# Patient Record
Sex: FEMALE | Race: WHITE | NOT HISPANIC OR LATINO | Employment: FULL TIME | URBAN - METROPOLITAN AREA
[De-identification: names, ages, dates, MRNs, and addresses within clinical notes are randomized per-mention and may not be internally consistent; named-entity substitution may affect disease eponyms.]

---

## 2019-09-24 ENCOUNTER — TELEPHONE (OUTPATIENT)
Dept: ENDOCRINOLOGY | Facility: CLINIC | Age: 59
End: 2019-09-24

## 2020-01-21 ENCOUNTER — LAB VISIT (OUTPATIENT)
Dept: LAB | Facility: HOSPITAL | Age: 60
End: 2020-01-21
Attending: INTERNAL MEDICINE
Payer: COMMERCIAL

## 2020-01-21 ENCOUNTER — OFFICE VISIT (OUTPATIENT)
Dept: ENDOCRINOLOGY | Facility: CLINIC | Age: 60
End: 2020-01-21
Payer: COMMERCIAL

## 2020-01-21 ENCOUNTER — TELEPHONE (OUTPATIENT)
Dept: ENDOCRINOLOGY | Facility: CLINIC | Age: 60
End: 2020-01-21

## 2020-01-21 VITALS
WEIGHT: 136.13 LBS | HEART RATE: 84 BPM | HEIGHT: 63 IN | BODY MASS INDEX: 24.12 KG/M2 | DIASTOLIC BLOOD PRESSURE: 80 MMHG | RESPIRATION RATE: 18 BRPM | SYSTOLIC BLOOD PRESSURE: 126 MMHG

## 2020-01-21 DIAGNOSIS — E55.9 VITAMIN D INSUFFICIENCY: ICD-10-CM

## 2020-01-21 DIAGNOSIS — E04.2 MULTIPLE THYROID NODULES: ICD-10-CM

## 2020-01-21 DIAGNOSIS — M81.0 AGE-RELATED OSTEOPOROSIS WITHOUT CURRENT PATHOLOGICAL FRACTURE: ICD-10-CM

## 2020-01-21 LAB
25(OH)D3+25(OH)D2 SERPL-MCNC: 12 NG/ML (ref 30–96)
ALBUMIN SERPL BCP-MCNC: 4.2 G/DL (ref 3.5–5.2)
ALP SERPL-CCNC: 160 U/L (ref 55–135)
ALT SERPL W/O P-5'-P-CCNC: 13 U/L (ref 10–44)
ANION GAP SERPL CALC-SCNC: 6 MMOL/L (ref 8–16)
AST SERPL-CCNC: 37 U/L (ref 10–40)
BILIRUB SERPL-MCNC: 0.2 MG/DL (ref 0.1–1)
BUN SERPL-MCNC: 13 MG/DL (ref 6–20)
CALCIUM SERPL-MCNC: 9.3 MG/DL (ref 8.7–10.5)
CHLORIDE SERPL-SCNC: 102 MMOL/L (ref 95–110)
CO2 SERPL-SCNC: 30 MMOL/L (ref 23–29)
CREAT SERPL-MCNC: 0.8 MG/DL (ref 0.5–1.4)
EST. GFR  (AFRICAN AMERICAN): >60 ML/MIN/1.73 M^2
EST. GFR  (NON AFRICAN AMERICAN): >60 ML/MIN/1.73 M^2
GLUCOSE SERPL-MCNC: 93 MG/DL (ref 70–110)
POTASSIUM SERPL-SCNC: 3.9 MMOL/L (ref 3.5–5.1)
PROT SERPL-MCNC: 7.5 G/DL (ref 6–8.4)
SODIUM SERPL-SCNC: 138 MMOL/L (ref 136–145)
TSH SERPL DL<=0.005 MIU/L-ACNC: 1.31 UIU/ML (ref 0.4–4)

## 2020-01-21 PROCEDURE — 3008F BODY MASS INDEX DOCD: CPT | Mod: CPTII,S$GLB,, | Performed by: INTERNAL MEDICINE

## 2020-01-21 PROCEDURE — 99999 PR PBB SHADOW E&M-EST. PATIENT-LVL III: ICD-10-PCS | Mod: PBBFAC,,, | Performed by: INTERNAL MEDICINE

## 2020-01-21 PROCEDURE — 3008F PR BODY MASS INDEX (BMI) DOCUMENTED: ICD-10-PCS | Mod: CPTII,S$GLB,, | Performed by: INTERNAL MEDICINE

## 2020-01-21 PROCEDURE — 80053 COMPREHEN METABOLIC PANEL: CPT

## 2020-01-21 PROCEDURE — 99999 PR PBB SHADOW E&M-EST. PATIENT-LVL III: CPT | Mod: PBBFAC,,, | Performed by: INTERNAL MEDICINE

## 2020-01-21 PROCEDURE — 99204 OFFICE O/P NEW MOD 45 MIN: CPT | Mod: S$GLB,,, | Performed by: INTERNAL MEDICINE

## 2020-01-21 PROCEDURE — 82306 VITAMIN D 25 HYDROXY: CPT

## 2020-01-21 PROCEDURE — 99204 PR OFFICE/OUTPT VISIT, NEW, LEVL IV, 45-59 MIN: ICD-10-PCS | Mod: S$GLB,,, | Performed by: INTERNAL MEDICINE

## 2020-01-21 PROCEDURE — 36415 COLL VENOUS BLD VENIPUNCTURE: CPT

## 2020-01-21 PROCEDURE — 84443 ASSAY THYROID STIM HORMONE: CPT

## 2020-01-21 NOTE — ASSESSMENT & PLAN NOTE
Foot fractures only   Otherwise no falls or fractures    Calcium and vitamin D -- due to personal experience of change in dilantin level and increase risk for seizures with vitamin D supplementation, would like to speak with neurologist before starting medication    Exercise for balance     DXA at her convenience

## 2020-01-21 NOTE — TELEPHONE ENCOUNTER
Called patient and left detailed message.     Vitamin D is low  Other labs are normal. In the past stopped vitamin D as she was concerned it changed her dilantin levels.     Have asked her to please check in with her  Neurologist regarding vitamin D replacement.       Vitamin D deficiency -- needs to start treatment but wants to check in with neurologist.     Results for VALENTINO NIEVES (MRN [id number]) as of 1/21/2020 10:18   Ref. Range 1/21/2020 08:57   Sodium Latest Ref Range: 136 - 145 mmol/L 138   Potassium Latest Ref Range: 3.5 - 5.1 mmol/L 3.9   Chloride Latest Ref Range: 95 - 110 mmol/L 102   CO2 Latest Ref Range: 23 - 29 mmol/L 30 (H)   Anion Gap Latest Ref Range: 8 - 16 mmol/L 6 (L)   BUN, Bld Latest Ref Range: 6 - 20 mg/dL 13   Creatinine Latest Ref Range: 0.5 - 1.4 mg/dL 0.8   eGFR if non African American Latest Ref Range: >60 mL/min/1.73 m^2 >60.0   eGFR if African American Latest Ref Range: >60 mL/min/1.73 m^2 >60.0   Glucose Latest Ref Range: 70 - 110 mg/dL 93   Calcium Latest Ref Range: 8.7 - 10.5 mg/dL 9.3   Alkaline Phosphatase Latest Ref Range: 55 - 135 U/L 160 (H)   PROTEIN TOTAL Latest Ref Range: 6.0 - 8.4 g/dL 7.5   Albumin Latest Ref Range: 3.5 - 5.2 g/dL 4.2   BILIRUBIN TOTAL Latest Ref Range: 0.1 - 1.0 mg/dL 0.2   AST Latest Ref Range: 10 - 40 U/L 37   ALT Latest Ref Range: 10 - 44 U/L 13   Vit D, 25-Hydroxy Latest Ref Range: 30 - 96 ng/mL 12 (L)   TSH Latest Ref Range: 0.400 - 4.000 uIU/mL 1.315

## 2020-01-21 NOTE — PROGRESS NOTES
Subjective:      Patient ID: Nicole Perdomo is a 59 y.o. female.    Chief Complaint:  Osteopenia and Thyroid Nodule      History of Present Illness  Ms. Perdomo is a 59 year old woman who is here for evaluation of multiple thyroid nodules. This was discovered six years ago by carotid ultrasound screening.      Subsequently thyroid nodule was confirmed on thyroid US, 2012, 2013 and 2015. Had FNA in 2013 of right solid nodule with benign findings and left nodule with cystic component demonstrates insufficient cells. Based on US appearance, we decided to repeat US in one year. She is here for follow up.     Denies pre-existing thyroid disease, denies symptoms of hyper- or hypo- thyroidism. Denies anterior neck pressure, dysphagia, or voice changes. Denies exposure to radiation or treatment with radiation to the head or neck.      Denies family history of thyroid cancer or thyroid disease.    Denies personal history of breast or colon cancer.      Osteopenia treated for seven years, with evista and alendronate thinks she took evista for two to three years, when became expensive stopped.    Fractured foot. Fell in her attic three years ago. Denies height loss or new back pain.   Balance is pretty good    Does not exercise    Supplements:  Calcium:   Whenever she remembers she takes it   Vitamin D: none   MVI: none  Due to change in dilantin levels does not like to take supplements    Dietary:  Milk, yogurt, cheese    Review of Systems   Constitutional: Negative for chills and fever.   HENT: Negative for congestion and sinus pressure.    Eyes: Negative for visual disturbance.   Respiratory: Negative for chest tightness and shortness of breath.    Cardiovascular: Negative for chest pain, palpitations and leg swelling.   Gastrointestinal: Negative for abdominal pain and vomiting.   Genitourinary: Negative for dysuria.   Musculoskeletal: Negative for arthralgias.   Skin: Negative for rash.   Neurological: Negative for  "weakness.   Hematological: Does not bruise/bleed easily.   Psychiatric/Behavioral: Positive for sleep disturbance (  occasionally).       Objective:   Physical Exam   Constitutional: She is oriented to person, place, and time. She appears well-developed and well-nourished. No distress.   Eyes: Pupils are equal, round, and reactive to light. Conjunctivae and EOM are normal. No scleral icterus.   No proptosis.    Neck: Normal range of motion. Neck supple. No tracheal deviation present. No thyromegaly present.   Cardiovascular: Normal rate and intact distal pulses.   Pulmonary/Chest: Effort normal and breath sounds normal.   Lymphadenopathy:     She has no cervical adenopathy.   Neurological: She is alert and oriented to person, place, and time. She has normal reflexes.   No tremor.   Skin: Skin is warm and dry. No rash noted.   Psychiatric: She has a normal mood and affect.     Vitals:    01/21/20 0812   BP: 126/80   BP Location: Left arm   Patient Position: Sitting   BP Method: Small (Manual)   Pulse: 84   Resp: 18   Weight: 61.7 kg (136 lb 2.1 oz)   Height: 5' 3" (1.6 m)       BP Readings from Last 3 Encounters:   01/21/20 126/80   06/17/15 128/77   06/03/13 130/90     Wt Readings from Last 1 Encounters:   01/21/20 0812 61.7 kg (136 lb 2.1 oz)         Body mass index is 24.12 kg/m².    Lab Review:   No results found for: HGBA1C  No results found for: CHOL, HDL, LDLCALC, TRIG, CHOLHDL  Lab Results   Component Value Date     01/21/2020    K 3.9 01/21/2020     01/21/2020    CO2 30 (H) 01/21/2020    GLU 93 01/21/2020    BUN 13 01/21/2020    CREATININE 0.8 01/21/2020    CALCIUM 9.3 01/21/2020    PROT 7.5 01/21/2020    ALBUMIN 4.2 01/21/2020    BILITOT 0.2 01/21/2020    ALKPHOS 160 (H) 01/21/2020    AST 37 01/21/2020    ALT 13 01/21/2020    ANIONGAP 6 (L) 01/21/2020    ESTGFRAFRICA >60.0 01/21/2020    EGFRNONAA >60.0 01/21/2020    TSH 1.315 01/21/2020      2015:  The right lobe measures 5.7 x 2.5 x 2.6 cm " and demonstrates two nodules one measuring 2.2 x 1.0 x 1.8 cm (previous of 2.0 x 1.0 x 1.8 centimeters) and an additional one measuring 3.2 x 2.4 x 2.6 cm (previously 3.3 x 2.3 x 2.6 centimeters).      The left lobe measures 4.1 x 1.4 x 1.8 cm and demonstrates a cyst within the superior pole of the lobe measuring 2.2 x 0.9 x 1.5 cm (previously 2.7 x 0.8 x 1.9 cm) and a nodule within the inferior pole measuring 0.9 x 0.6 x 0.9 cm (previously 0.9 x 0.6 x   0.9 cm).     FNA 2013:  SPECIMEN  1) FNA LEFT Thyroid (Clinician)  FINAL PATHOLOGIC DIAGNOSIS  [employer] Cytopathology Thyroid Category:Unsatisfactory. Macrophages only.  SPECIMEN  1) FNA RIGHT Thyroid (Clinician)  FINAL PATHOLOGIC DIAGNOSIS  [employer] CYTOPATHOLOGY CATEGORY: BENIGN/NEGATIVE FOR MALIGNANCY  Innocuous follicular clusters. A small amount of colloid.  Assessment and Plan     Multiple thyroid nodules  Needs updated TSH  Clinically euthyroid         Age-related osteoporosis without current pathological fracture  Foot fractures only   Otherwise no falls or fractures    Calcium and vitamin D -- due to personal experience of change in dilantin level and increase risk for seizures with vitamin D supplementation, would like to speak with neurologist before starting medication    Exercise for balance     DXA at her convenience    Vitamin D insufficiency  Previous levels low  No supplementation   Repeat levels today

## 2020-02-24 ENCOUNTER — HOSPITAL ENCOUNTER (OUTPATIENT)
Dept: RADIOLOGY | Facility: HOSPITAL | Age: 60
Discharge: HOME | End: 2020-02-24
Attending: INTERNAL MEDICINE
Payer: COMMERCIAL

## 2020-02-24 ENCOUNTER — HOSPITAL ENCOUNTER (OUTPATIENT)
Dept: RADIOLOGY | Facility: CLINIC | Age: 60
Discharge: HOME | End: 2020-02-24
Attending: INTERNAL MEDICINE
Payer: COMMERCIAL

## 2020-02-24 DIAGNOSIS — M81.0 AGE-RELATED OSTEOPOROSIS WITHOUT CURRENT PATHOLOGICAL FRACTURE: ICD-10-CM

## 2020-02-24 DIAGNOSIS — E04.2 MULTIPLE THYROID NODULES: ICD-10-CM

## 2020-02-24 PROCEDURE — 76536 US EXAM OF HEAD AND NECK: CPT | Mod: TC

## 2020-02-24 PROCEDURE — 76536 US SOFT TISSUE HEAD NECK THYROID: ICD-10-PCS | Mod: 26,,, | Performed by: INTERNAL MEDICINE

## 2020-02-24 PROCEDURE — 77080 DEXA BONE DENSITY SPINE HIP: ICD-10-PCS | Mod: 26,,, | Performed by: INTERNAL MEDICINE

## 2020-02-24 PROCEDURE — 76536 US EXAM OF HEAD AND NECK: CPT | Mod: 26,,, | Performed by: INTERNAL MEDICINE

## 2020-02-24 PROCEDURE — 77080 DXA BONE DENSITY AXIAL: CPT | Mod: 26,,, | Performed by: INTERNAL MEDICINE

## 2020-02-24 PROCEDURE — 77080 DXA BONE DENSITY AXIAL: CPT | Mod: TC

## 2020-02-26 DIAGNOSIS — Z12.31 VISIT FOR SCREENING MAMMOGRAM: Primary | ICD-10-CM

## 2020-02-28 ENCOUNTER — HOSPITAL ENCOUNTER (OUTPATIENT)
Dept: RADIOLOGY | Facility: HOSPITAL | Age: 60
Discharge: HOME | End: 2020-02-28
Attending: OBSTETRICS & GYNECOLOGY
Payer: COMMERCIAL

## 2020-02-28 DIAGNOSIS — Z12.31 VISIT FOR SCREENING MAMMOGRAM: ICD-10-CM

## 2020-02-28 PROCEDURE — 77067 MAMMO DIGITAL SCREENING BILAT WITH TOMOSYNTHESIS_CAD: ICD-10-PCS | Mod: 26,,, | Performed by: RADIOLOGY

## 2020-02-28 PROCEDURE — 77067 SCR MAMMO BI INCL CAD: CPT | Mod: 26,,, | Performed by: RADIOLOGY

## 2020-02-28 PROCEDURE — 77063 MAMMO DIGITAL SCREENING BILAT WITH TOMOSYNTHESIS_CAD: ICD-10-PCS | Mod: 26,,, | Performed by: RADIOLOGY

## 2020-02-28 PROCEDURE — 77067 SCR MAMMO BI INCL CAD: CPT | Mod: TC

## 2020-02-28 PROCEDURE — 77063 BREAST TOMOSYNTHESIS BI: CPT | Mod: 26,,, | Performed by: RADIOLOGY

## 2020-05-19 ENCOUNTER — TELEPHONE (OUTPATIENT)
Dept: ENDOCRINOLOGY | Facility: CLINIC | Age: 60
End: 2020-05-19

## 2020-05-19 NOTE — TELEPHONE ENCOUNTER
----- Message from Errol Luna sent at 5/19/2020  2:35 PM CDT -----  Contact: Patient @ [phone]  Patient requesting a return call about getting a high dose Vitamin D, [facility] call or forward to:     [facility] - [place] ([place], [place] [address]  [address] ([place] [address]  Phone: [phone] Fax: [phone]

## 2020-05-19 NOTE — TELEPHONE ENCOUNTER
Talk to pt she want to take higher dosage of  vit d I ask can she do vit d lab to check  her levels since  it's been 6 months but said that her primary dr told her do not need to do labs dr euceda should just changed her dose higher. I told pt I will send dr euceda a message to see if she can changed her medication with doing labs. Pt understand

## 2020-05-22 ENCOUNTER — TELEPHONE (OUTPATIENT)
Dept: ENDOCRINOLOGY | Facility: CLINIC | Age: 60
End: 2020-05-22

## 2020-05-22 DIAGNOSIS — M81.0 AGE-RELATED OSTEOPOROSIS WITHOUT CURRENT PATHOLOGICAL FRACTURE: ICD-10-CM

## 2020-05-22 DIAGNOSIS — E55.9 VITAMIN D INSUFFICIENCY: Primary | ICD-10-CM

## 2020-05-22 RX ORDER — ERGOCALCIFEROL 1.25 MG/1
50000 CAPSULE ORAL
Qty: 12 CAPSULE | Refills: 0 | Status: SHIPPED | OUTPATIENT
Start: 2020-05-22 | End: 2022-08-16

## 2020-05-22 NOTE — TELEPHONE ENCOUNTER
----- Message from Giselle Caruso MD sent at 5/22/2020  9:30 AM CDT -----  Labs in five to six months, f/u one week later

## 2021-03-15 ENCOUNTER — IMMUNIZATION (OUTPATIENT)
Dept: PRIMARY CARE CLINIC | Facility: CLINIC | Age: 61
End: 2021-03-15
Payer: COMMERCIAL

## 2021-03-15 DIAGNOSIS — Z23 NEED FOR VACCINATION: Primary | ICD-10-CM

## 2021-03-15 PROCEDURE — 0001A COVID-19, MRNA, LNP-S, PF, 30 MCG/0.3 ML DOSE VACCINE: ICD-10-PCS | Mod: CV19,S$GLB,, | Performed by: INTERNAL MEDICINE

## 2021-03-15 PROCEDURE — 91300 COVID-19, MRNA, LNP-S, PF, 30 MCG/0.3 ML DOSE VACCINE: CPT | Mod: S$GLB,,, | Performed by: INTERNAL MEDICINE

## 2021-03-15 PROCEDURE — 91300 COVID-19, MRNA, LNP-S, PF, 30 MCG/0.3 ML DOSE VACCINE: ICD-10-PCS | Mod: S$GLB,,, | Performed by: INTERNAL MEDICINE

## 2021-03-15 PROCEDURE — 0001A COVID-19, MRNA, LNP-S, PF, 30 MCG/0.3 ML DOSE VACCINE: CPT | Mod: CV19,S$GLB,, | Performed by: INTERNAL MEDICINE

## 2021-04-05 ENCOUNTER — IMMUNIZATION (OUTPATIENT)
Dept: PRIMARY CARE CLINIC | Facility: CLINIC | Age: 61
End: 2021-04-05
Payer: COMMERCIAL

## 2021-04-05 DIAGNOSIS — Z23 NEED FOR VACCINATION: Primary | ICD-10-CM

## 2021-04-05 PROCEDURE — 91300 COVID-19, MRNA, LNP-S, PF, 30 MCG/0.3 ML DOSE VACCINE: ICD-10-PCS | Mod: S$GLB,,, | Performed by: INTERNAL MEDICINE

## 2021-04-05 PROCEDURE — 91300 COVID-19, MRNA, LNP-S, PF, 30 MCG/0.3 ML DOSE VACCINE: CPT | Mod: S$GLB,,, | Performed by: INTERNAL MEDICINE

## 2021-04-05 PROCEDURE — 0002A COVID-19, MRNA, LNP-S, PF, 30 MCG/0.3 ML DOSE VACCINE: ICD-10-PCS | Mod: CV19,S$GLB,, | Performed by: INTERNAL MEDICINE

## 2021-04-05 PROCEDURE — 0002A COVID-19, MRNA, LNP-S, PF, 30 MCG/0.3 ML DOSE VACCINE: CPT | Mod: CV19,S$GLB,, | Performed by: INTERNAL MEDICINE

## 2022-04-25 ENCOUNTER — TELEPHONE (OUTPATIENT)
Dept: ENDOCRINOLOGY | Facility: CLINIC | Age: 62
End: 2022-04-25
Payer: COMMERCIAL

## 2022-04-25 NOTE — TELEPHONE ENCOUNTER
----- Message from Mulu Stubbs sent at 4/25/2022 10:35 AM CDT -----  Regarding: appt  Contact: [phone] or [phone]  Patient Nicole is calling. Patient would like to schedule an appt. Patient is having problems with her thyroids. Please call [phone] or [phone]      Thank You

## 2022-05-31 ENCOUNTER — NURSE TRIAGE (OUTPATIENT)
Dept: ADMINISTRATIVE | Facility: CLINIC | Age: 62
End: 2022-05-31
Payer: COMMERCIAL

## 2022-05-31 NOTE — TELEPHONE ENCOUNTER
[place]    PCP:  None    C/O scratchy throat, nasal congestion, runny nose, and cough.  Denies SOB, fever, and CP.  SpO2=97% GZ=158.  Per protocol, care advised is home care.  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  [facility] offered to pt and accepted.  NOC spoke with She with [facility] and call warm transferred to [facility] to schedule an appt for tomorrow morning which is the 1st available appt that they have.  Instructed to call for questions/concerns.  VU.  Unable to route d/t no PCP.    Reason for Disposition   [1] COVID-19 infection suspected by caller or triager AND [2] mild symptoms (cough, fever, or others) AND [3] has not gotten tested yet    Additional Information   Negative: SEVERE difficulty breathing (e.g., struggling for each breath, speaks in single words)   Negative: Difficult to awaken or acting confused (e.g., disoriented, slurred speech)   Negative: Bluish (or gray) lips or face now   Negative: Shock suspected (e.g., cold/pale/clammy skin, too weak to stand, low BP, rapid pulse)   Negative: Sounds like a life-threatening emergency to the triager   Negative: SEVERE or constant chest pain or pressure  (Exception: Mild central chest pain, present only when coughing.)   Negative: MODERATE difficulty breathing (e.g., speaks in phrases, SOB even at rest, pulse 100-120)   Negative: [1] Headache AND [2] stiff neck (can't touch chin to chest)   Negative: Oxygen level (e.g., pulse oximetry) 90 percent or lower   Negative: Chest pain or pressure   Negative: Patient sounds very sick or weak to the triager   Negative: MILD difficulty breathing (e.g., minimal/no SOB at rest, SOB with walking, pulse <100)   Negative: Fever > 103 F (39.4 C)   Negative: [1] Fever > 101 F (38.3 C) AND [2] age > 60 years   Negative: [1] Fever > 100.0 F (37.8 C) AND [2] bedridden (e.g., nursing home patient, CVA, chronic illness, recovering from  surgery)   Negative: HIGH RISK for severe COVID complications (e.g., weak immune system, age > 64 years, obesity with BMI > 25, pregnant, chronic lung disease or other chronic medical condition)  (Exception: Already seen by PCP and no new or worsening symptoms.)   Negative: [1] HIGH RISK patient AND [2] influenza is widespread in the community AND [3] ONE OR MORE respiratory symptoms: cough, sore throat, runny or stuffy nose   Negative: [1] HIGH RISK patient AND [2] influenza exposure within the last 7 days AND [3] ONE OR MORE respiratory symptoms: cough, sore throat, runny or stuffy nose   Negative: Oxygen level (e.g., pulse oximetry) 91 to 94 percent   Negative: Fever present > 3 days (72 hours)   Negative: [1] Fever returns after gone for over 24 hours AND [2] symptoms worse or not improved   Negative: [1] Continuous (nonstop) coughing interferes with work or school AND [2] no improvement using cough treatment per Care Advice   Negative: [1] COVID-19 infection suspected by caller or triager AND [2] mild symptoms (cough, fever, or others) AND [3] negative COVID-19 rapid test   Negative: Cough present > 3 weeks   Negative: [1] COVID-19 diagnosed by positive lab test (e.g., PCR, rapid self-test kit) AND [2] NO symptoms (e.g., cough, fever, others)   Negative: [1] COVID-19 diagnosed by positive lab test (e.g., PCR, rapid self-test kit) AND [2] mild symptoms (e.g., cough, fever, others) AND [3] no complications or SOB   Negative: [1] COVID-19 diagnosed by doctor (or NP/PA) AND [2] mild symptoms (e.g., cough, fever, others) AND [3] no complications or SOB   Negative: [1] COVID-19 diagnosed AND [2] has mild nausea, vomiting or diarrhea    Protocols used: CORONAVIRUS (COVID-19) DIAGNOSED OR [id number]

## 2022-06-01 ENCOUNTER — NURSE TRIAGE (OUTPATIENT)
Dept: ADMINISTRATIVE | Facility: CLINIC | Age: 62
End: 2022-06-01
Payer: COMMERCIAL

## 2022-06-01 NOTE — TELEPHONE ENCOUNTER
Patient states she spoke with [facility] last night and they were to come out today. The appointment she had for today did not work, so she canceled it. Now would like to re book appointment. She would like to speak with [facility]. Call transferred.     Reason for Disposition   Information only question and nurse able to answer    Protocols used: INFORMATION ONLY CALL - NO TRIAGE-A-[place]

## 2022-06-01 NOTE — TELEPHONE ENCOUNTER
Patient states she has an appointment with [facility] scheduled for 6/2/22 @ 4PM for a Covid-19 test. Patient called to inquire if there are any earlier appointments available with [facility] for today, 6/01/22.    Triage RN contacted [facility] and spoke with Representative She and discussed patient's request for an earlier appointment. She stated that there are no appointments available today and patient is on a call list for cancellations.    Care Advice given to patient that no appointments are available at this time with [facility] for today and that her name is on a call list for cancellations. Patient stated understanding of Care Advice and cites no additional concerns at this time.    Reason for Disposition   General information question, no triage required and triager able to answer question    Additional Information   Negative: [1] Caller is not with the adult (patient) AND [2] reporting urgent symptoms   Negative: Lab result questions   Negative: Medication questions   Negative: Caller can't be reached by phone   Negative: Caller has already spoken to PCP or another triager   Negative: RN needs further essential information from caller in order to complete triage   Negative: Requesting regular office appointment   Negative: [1] Caller requesting NON-URGENT health information AND [2] PCP's office is the best resource   Negative: Health Information question, no triage required and triager able to answer question    Protocols used: INFORMATION ONLY CALL - [facility]

## 2022-08-15 NOTE — PROGRESS NOTES
Subjective:      Patient ID: Nicole Perdomo is a 62 y.o. female.    Chief Complaint:  Thyroid Nodule      History of Present Illness    Ms. Perdomo is a 62 year old woman who is here for evaluation of multiple thyroid nodules. This was discovered six years ago by carotid ultrasound screening.      Subsequently thyroid nodule was confirmed on thyroid US, 2012, 2013 and 2015. Had FNA in 2013 of right solid nodule with benign findings and left nodule with cystic component demonstrates insufficient cells. Based on US appearance, we decided to repeat US only.     Also reports GERD symptoms at night.   Denies pre-existing thyroid disease, denies symptoms of hyper- or hypo- thyroidism. Denies anterior neck pressure, dysphagia, or voice changes. Denies exposure to radiation or treatment with radiation to the head or neck.      Denies family history of thyroid cancer or thyroid disease.    Denies personal history of breast or colon cancer.      Osteoporosis treated for seven years.  Stopped fosamax ten years ago  Stopped taking vitamin D   Evista for about three years (after fosamax)     Balance is pretty good   Does not exercise     Supplements:  Calcium:   Whenever she remembers she takes it   Vitamin D: none   MVI: none  Due to change in dilantin levels does not like to take supplements     Dietary:  Milk, yogurt, cheese    Review of Systems  No recent illness   Objective:   Physical Exam  Constitutional:       General: She is not in acute distress.     Appearance: She is well-developed.   Eyes:      General: No scleral icterus.     Conjunctiva/sclera: Conjunctivae normal.      Pupils: Pupils are equal, round, and reactive to light.      Comments: No proptosis.    Neck:      Thyroid: No thyromegaly.      Trachea: No tracheal deviation.   Cardiovascular:      Rate and Rhythm: Normal rate.   Pulmonary:      Effort: Pulmonary effort is normal.      Breath sounds: Normal breath sounds.   Musculoskeletal:      Cervical back:  "Normal range of motion and neck supple.   Lymphadenopathy:      Cervical: No cervical adenopathy.   Skin:     General: Skin is warm and dry.      Findings: No rash.   Neurological:      Mental Status: She is alert and oriented to person, place, and time.      Deep Tendon Reflexes: Reflexes are normal and symmetric.      Comments: No tremor.       Vitals:    08/16/22 1435   BP: 126/80   BP Location: Left arm   Patient Position: Sitting   BP Method: Medium (Manual)   Pulse: 98   SpO2: 96%   Weight: 65.4 kg (144 lb 1.1 oz)   Height: 5' 3" (1.6 m)       BP Readings from Last 3 Encounters:   08/16/22 126/80   01/21/20 126/80   06/17/15 128/77     Wt Readings from Last 1 Encounters:   08/16/22 1435 65.4 kg (144 lb 1.1 oz)         Body mass index is 25.52 kg/m².    Lab Review:   No results found for: HGBA1C  No results found for: CHOL, HDL, LDLCALC, TRIG, CHOLHDL  Lab Results   Component Value Date     01/21/2020    K 3.9 01/21/2020     01/21/2020    CO2 30 (H) 01/21/2020    GLU 93 01/21/2020    BUN 13 01/21/2020    CREATININE 0.8 01/21/2020    CALCIUM 9.3 01/21/2020    PROT 7.5 01/21/2020    ALBUMIN 4.2 01/21/2020    BILITOT 0.2 01/21/2020    ALKPHOS 160 (H) 01/21/2020    AST 37 01/21/2020    ALT 13 01/21/2020    ANIONGAP 6 (L) 01/21/2020    ESTGFRAFRICA >60.0 01/21/2020    EGFRNONAA >60.0 01/21/2020    TSH 1.315 01/21/2020     US 2020  The gland is enlarged on the right, measuring 5.7 x 2.5 x 2.3 cm.  The left lobe is not enlarged, 3.8 x 1.4 x 1.6 cm.     The right thyroid demonstrates a superior 2.1 x 0.9 x 1.4 cm solid hypoechoic nodule, TR 4.  This would meet criteria for fine-needle aspiration based on its size.  This is stable compared to May 2013.     The mid to inferior right thyroid demonstrates a 3.3 x 2.4 x 2.3 cm solid nodule which has undergone prior fine-needle aspiration.  It is unchanged in size.     The left thyroid demonstrates a 1.4 x 0.4 x 0.9 cm predominantly cystic nodule which has " undergone prior fine-needle aspiration and is decreased in size today.     Additional subcentimeter left thyroid nodules are present, benign.     No abnormal lymphadenopathy in the neck.       Assessment and Plan     Multiple thyroid nodules  No labs done recently     Lab: TSH and ultrasound of the thyroid     Vitamin D insufficiency  Very low vitamin D in the past   Recommend over the counter vitamin D 1000 - 2000 IUs     Age-related osteoporosis without current pathological fracture  Risk factors: , early 50s, no MHT and vitamin D deficiency     Restart 1000 IUs daily   Dietary calcium     Needs updated DXA scan   Could start alendronate again

## 2022-08-16 ENCOUNTER — OFFICE VISIT (OUTPATIENT)
Dept: ENDOCRINOLOGY | Facility: CLINIC | Age: 62
End: 2022-08-16
Payer: COMMERCIAL

## 2022-08-16 VITALS
BODY MASS INDEX: 25.52 KG/M2 | DIASTOLIC BLOOD PRESSURE: 80 MMHG | SYSTOLIC BLOOD PRESSURE: 126 MMHG | WEIGHT: 144.06 LBS | OXYGEN SATURATION: 96 % | HEIGHT: 63 IN | HEART RATE: 98 BPM

## 2022-08-16 DIAGNOSIS — M81.0 AGE-RELATED OSTEOPOROSIS WITHOUT CURRENT PATHOLOGICAL FRACTURE: ICD-10-CM

## 2022-08-16 DIAGNOSIS — E04.2 MULTIPLE THYROID NODULES: ICD-10-CM

## 2022-08-16 DIAGNOSIS — E55.9 VITAMIN D INSUFFICIENCY: ICD-10-CM

## 2022-08-16 PROCEDURE — 99999 PR PBB SHADOW E&M-EST. PATIENT-LVL III: CPT | Mod: PBBFAC,,, | Performed by: INTERNAL MEDICINE

## 2022-08-16 PROCEDURE — 3074F SYST BP LT 130 MM HG: CPT | Mod: CPTII,S$GLB,, | Performed by: INTERNAL MEDICINE

## 2022-08-16 PROCEDURE — 3074F PR MOST RECENT SYSTOLIC BLOOD PRESSURE < 130 MM HG: ICD-10-PCS | Mod: CPTII,S$GLB,, | Performed by: INTERNAL MEDICINE

## 2022-08-16 PROCEDURE — 3079F DIAST BP 80-89 MM HG: CPT | Mod: CPTII,S$GLB,, | Performed by: INTERNAL MEDICINE

## 2022-08-16 PROCEDURE — 3008F BODY MASS INDEX DOCD: CPT | Mod: CPTII,S$GLB,, | Performed by: INTERNAL MEDICINE

## 2022-08-16 PROCEDURE — 1159F PR MEDICATION LIST DOCUMENTED IN MEDICAL RECORD: ICD-10-PCS | Mod: CPTII,S$GLB,, | Performed by: INTERNAL MEDICINE

## 2022-08-16 PROCEDURE — 1159F MED LIST DOCD IN RCRD: CPT | Mod: CPTII,S$GLB,, | Performed by: INTERNAL MEDICINE

## 2022-08-16 PROCEDURE — 99214 OFFICE O/P EST MOD 30 MIN: CPT | Mod: S$GLB,,, | Performed by: INTERNAL MEDICINE

## 2022-08-16 PROCEDURE — 3008F PR BODY MASS INDEX (BMI) DOCUMENTED: ICD-10-PCS | Mod: CPTII,S$GLB,, | Performed by: INTERNAL MEDICINE

## 2022-08-16 PROCEDURE — 99999 PR PBB SHADOW E&M-EST. PATIENT-LVL III: ICD-10-PCS | Mod: PBBFAC,,, | Performed by: INTERNAL MEDICINE

## 2022-08-16 PROCEDURE — 3079F PR MOST RECENT DIASTOLIC BLOOD PRESSURE 80-89 MM HG: ICD-10-PCS | Mod: CPTII,S$GLB,, | Performed by: INTERNAL MEDICINE

## 2022-08-16 PROCEDURE — 99214 PR OFFICE/OUTPT VISIT, EST, LEVL IV, 30-39 MIN: ICD-10-PCS | Mod: S$GLB,,, | Performed by: INTERNAL MEDICINE

## 2022-08-16 NOTE — PATIENT INSTRUCTIONS
Check your vitamin D levels, kidney and liver function tests and thyroid tests     Recommend vitamin D 2000 IUs daily     Low vitamin D is not good for bones.     Schedule bone density test     Schedule an ultrasound of thyroid gland.

## 2022-08-16 NOTE — ASSESSMENT & PLAN NOTE
Risk factors: , early 50s, no MHT and vitamin D deficiency     Restart 1000 IUs daily   Dietary calcium     Needs updated DXA scan   Could start alendronate again

## 2022-08-22 DIAGNOSIS — M81.0 AGE-RELATED OSTEOPOROSIS WITHOUT CURRENT PATHOLOGICAL FRACTURE: Primary | ICD-10-CM

## 2022-08-22 NOTE — TELEPHONE ENCOUNTER
----- Message from Nick Dodge sent at 8/22/2022  3:11 PM CDT -----  Contact: [phone]  Pt is calling in stating that she thought she had a prescription for  (alendronate (FOSAMAX) 70 MG tablet) but it was not sent in yet. Pt states that she believes there was a second prescription, please call to discuss further.

## 2022-08-22 NOTE — TELEPHONE ENCOUNTER
----- Message from Nick Dodge sent at 8/22/2022  3:11 PM CDT -----  Contact: [phone]  Pt is calling in stating that she thought she had a prescription for  (alendronate (FOSAMAX) 70 MG tablet) but it was not sent in yet. Pt states that she believes there was a second prescription, please call to discuss further.

## 2022-08-23 RX ORDER — ALENDRONATE SODIUM 70 MG/1
TABLET ORAL
Qty: 4 TABLET | Refills: 11 | Status: SHIPPED | OUTPATIENT
Start: 2022-08-23 | End: 2023-08-22

## 2022-09-06 ENCOUNTER — LAB VISIT (OUTPATIENT)
Dept: LAB | Facility: HOSPITAL | Age: 62
End: 2022-09-06
Attending: INTERNAL MEDICINE
Payer: COMMERCIAL

## 2022-09-06 ENCOUNTER — HOSPITAL ENCOUNTER (OUTPATIENT)
Dept: ENDOCRINOLOGY | Facility: CLINIC | Age: 62
Discharge: HOME | End: 2022-09-06
Attending: INTERNAL MEDICINE
Payer: COMMERCIAL

## 2022-09-06 DIAGNOSIS — E04.2 MULTIPLE THYROID NODULES: ICD-10-CM

## 2022-09-06 DIAGNOSIS — M81.0 AGE-RELATED OSTEOPOROSIS WITHOUT CURRENT PATHOLOGICAL FRACTURE: ICD-10-CM

## 2022-09-06 DIAGNOSIS — E55.9 VITAMIN D INSUFFICIENCY: ICD-10-CM

## 2022-09-06 LAB
25(OH)D3+25(OH)D2 SERPL-MCNC: 14 NG/ML (ref 30–96)
ALBUMIN SERPL BCP-MCNC: 4 G/DL (ref 3.5–5.2)
ALP SERPL-CCNC: 154 U/L (ref 55–135)
ALT SERPL W/O P-5'-P-CCNC: 13 U/L (ref 10–44)
ANION GAP SERPL CALC-SCNC: 5 MMOL/L (ref 8–16)
AST SERPL-CCNC: 37 U/L (ref 10–40)
BILIRUB SERPL-MCNC: 0.3 MG/DL (ref 0.1–1)
BUN SERPL-MCNC: 13 MG/DL (ref 8–23)
CALCIUM SERPL-MCNC: 9.2 MG/DL (ref 8.7–10.5)
CHLORIDE SERPL-SCNC: 104 MMOL/L (ref 95–110)
CO2 SERPL-SCNC: 29 MMOL/L (ref 23–29)
CREAT SERPL-MCNC: 0.8 MG/DL (ref 0.5–1.4)
EST. GFR  (NO RACE VARIABLE): >60 ML/MIN/1.73 M^2
GLUCOSE SERPL-MCNC: 80 MG/DL (ref 70–110)
POTASSIUM SERPL-SCNC: 4.1 MMOL/L (ref 3.5–5.1)
PROT SERPL-MCNC: 6.9 G/DL (ref 6–8.4)
SODIUM SERPL-SCNC: 138 MMOL/L (ref 136–145)
TSH SERPL DL<=0.005 MIU/L-ACNC: 1.01 UIU/ML (ref 0.4–4)

## 2022-09-06 PROCEDURE — 80053 COMPREHEN METABOLIC PANEL: CPT | Performed by: INTERNAL MEDICINE

## 2022-09-06 PROCEDURE — 76536 US SOFT TISSUE HEAD NECK THYROID: ICD-10-PCS | Mod: S$GLB,,, | Performed by: GENERAL ACUTE CARE HOSPITAL

## 2022-09-06 PROCEDURE — 82306 VITAMIN D 25 HYDROXY: CPT | Performed by: INTERNAL MEDICINE

## 2022-09-06 PROCEDURE — 36415 COLL VENOUS BLD VENIPUNCTURE: CPT | Performed by: INTERNAL MEDICINE

## 2022-09-06 PROCEDURE — 76536 US EXAM OF HEAD AND NECK: CPT | Mod: S$GLB,,, | Performed by: GENERAL ACUTE CARE HOSPITAL

## 2022-09-06 PROCEDURE — 84443 ASSAY THYROID STIM HORMONE: CPT | Performed by: INTERNAL MEDICINE

## 2022-09-22 ENCOUNTER — TELEPHONE (OUTPATIENT)
Dept: RADIOLOGY | Facility: HOSPITAL | Age: 62
End: 2022-09-22
Payer: COMMERCIAL

## 2022-09-22 NOTE — TELEPHONE ENCOUNTER
----- Message from Neena Willams sent at 9/22/2022  3:06 PM CDT -----  Regarding: FW: Sooner appointment  Contact: [phone]    ----- Message -----  From: Eli Gudino  Sent: 9/22/2022   3:06 PM CDT  To: , #  Subject: Sooner appointment                               Calling in regards to sooner appointment. Please call

## 2022-10-06 ENCOUNTER — HOSPITAL ENCOUNTER (OUTPATIENT)
Dept: RADIOLOGY | Facility: HOSPITAL | Age: 62
Discharge: HOME | End: 2022-10-06
Payer: COMMERCIAL

## 2022-10-06 VITALS — WEIGHT: 135 LBS | HEIGHT: 62 IN | BODY MASS INDEX: 24.84 KG/M2

## 2022-10-06 DIAGNOSIS — Z12.31 BREAST CANCER SCREENING BY MAMMOGRAM: ICD-10-CM

## 2022-10-06 PROCEDURE — 77067 SCR MAMMO BI INCL CAD: CPT | Mod: 26,,, | Performed by: RADIOLOGY

## 2022-10-06 PROCEDURE — 77067 MAMMO DIGITAL SCREENING BILAT WITH TOMO: ICD-10-PCS | Mod: 26,,, | Performed by: RADIOLOGY

## 2022-10-06 PROCEDURE — 77063 BREAST TOMOSYNTHESIS BI: CPT | Mod: 26,,, | Performed by: RADIOLOGY

## 2022-10-06 PROCEDURE — 77063 BREAST TOMOSYNTHESIS BI: CPT | Mod: TC

## 2022-10-06 PROCEDURE — 77067 SCR MAMMO BI INCL CAD: CPT | Mod: TC

## 2022-10-06 PROCEDURE — 77063 MAMMO DIGITAL SCREENING BILAT WITH TOMO: ICD-10-PCS | Mod: 26,,, | Performed by: RADIOLOGY

## 2023-03-03 NOTE — TELEPHONE ENCOUNTER
Called patient to schedule mammogram. Left voice message with scheduling's number.  
I personally performed the service described in the documentation  recorded by the scribe in my presence, and it accurately and completely records my words and action.

## 2024-05-28 NOTE — TELEPHONE ENCOUNTER
----- Message from Bonnie Briseno sent at 9/24/2019 11:31 AM CDT -----  Contact: Patient  Patient currently has an appointment but is requesting a sooner appointment       [phone] or [phone]  
Talk to pt explained to her dr euceda has no earlier  appointment at this time if she can [facility] or called everyday to see if they any cancellation appointment. Due to high volume I can look at all patient that needs earlier appointment. Pt understand will due so.    
Refer to the Assessment tab to view/cancel completed assessment.

## 2025-05-06 ENCOUNTER — LAB VISIT (OUTPATIENT)
Dept: LAB | Facility: HOSPITAL | Age: 65
End: 2025-05-06
Attending: INTERNAL MEDICINE
Payer: COMMERCIAL

## 2025-05-06 ENCOUNTER — OFFICE VISIT (OUTPATIENT)
Dept: ENDOCRINOLOGY | Facility: CLINIC | Age: 65
End: 2025-05-06
Payer: COMMERCIAL

## 2025-05-06 VITALS
BODY MASS INDEX: 25.53 KG/M2 | DIASTOLIC BLOOD PRESSURE: 82 MMHG | OXYGEN SATURATION: 98 % | HEIGHT: 62 IN | HEART RATE: 90 BPM | WEIGHT: 138.75 LBS | SYSTOLIC BLOOD PRESSURE: 122 MMHG

## 2025-05-06 DIAGNOSIS — E04.2 MULTIPLE THYROID NODULES: ICD-10-CM

## 2025-05-06 DIAGNOSIS — E04.2 MULTIPLE THYROID NODULES: Primary | ICD-10-CM

## 2025-05-06 DIAGNOSIS — E55.9 VITAMIN D INSUFFICIENCY: ICD-10-CM

## 2025-05-06 DIAGNOSIS — M81.0 AGE-RELATED OSTEOPOROSIS WITHOUT CURRENT PATHOLOGICAL FRACTURE: ICD-10-CM

## 2025-05-06 LAB
25(OH)D3+25(OH)D2 SERPL-MCNC: 11 NG/ML (ref 30–96)
TSH SERPL-ACNC: 1.11 UIU/ML (ref 0.4–4)

## 2025-05-06 PROCEDURE — 36415 COLL VENOUS BLD VENIPUNCTURE: CPT

## 2025-05-06 PROCEDURE — 99214 OFFICE O/P EST MOD 30 MIN: CPT | Mod: S$GLB,,, | Performed by: INTERNAL MEDICINE

## 2025-05-06 PROCEDURE — G2211 COMPLEX E/M VISIT ADD ON: HCPCS | Mod: S$GLB,,, | Performed by: INTERNAL MEDICINE

## 2025-05-06 PROCEDURE — 3079F DIAST BP 80-89 MM HG: CPT | Mod: CPTII,S$GLB,, | Performed by: INTERNAL MEDICINE

## 2025-05-06 PROCEDURE — 3008F BODY MASS INDEX DOCD: CPT | Mod: CPTII,S$GLB,, | Performed by: INTERNAL MEDICINE

## 2025-05-06 PROCEDURE — 1159F MED LIST DOCD IN RCRD: CPT | Mod: CPTII,S$GLB,, | Performed by: INTERNAL MEDICINE

## 2025-05-06 PROCEDURE — 82306 VITAMIN D 25 HYDROXY: CPT

## 2025-05-06 PROCEDURE — 99999 PR PBB SHADOW E&M-EST. PATIENT-LVL IV: CPT | Mod: PBBFAC,,, | Performed by: INTERNAL MEDICINE

## 2025-05-06 PROCEDURE — 3074F SYST BP LT 130 MM HG: CPT | Mod: CPTII,S$GLB,, | Performed by: INTERNAL MEDICINE

## 2025-05-06 PROCEDURE — 84443 ASSAY THYROID STIM HORMONE: CPT

## 2025-05-06 RX ORDER — ERGOCALCIFEROL 1.25 MG/1
50000 CAPSULE ORAL
Qty: 12 CAPSULE | Refills: 3 | Status: SHIPPED | OUTPATIENT
Start: 2025-05-06

## 2025-05-06 NOTE — PROGRESS NOTES
"Subjective:      Patient ID: Nicole Perdomo is a 64 y.o. female.    Chief Complaint:  Follow-up      History of Present Illness    Ms. Perdomo is a 64 year old woman who is here for evaluation of multiple thyroid nodules. This was discovered 13 years ago by carotid ultrasound screening.      Subsequently thyroid nodules were confirmed on thyroid US, 2012, 2013 and 2015.  3 nodules all aspirated   Right superior 1.9 cm hypoechoic   Right mid 3.2 cm isoechoic   Left nodule 1.0 cm heterogenous     Had FNA in 2013 of right solid nodule with benign findings and left nodule with cystic component demonstrates insufficient cells.   Based on US appearance, we decided to repeat US only.     Also reports GERD symptoms at night.   Denies pre-existing thyroid disease, denies symptoms of hyper- or hypo- thyroidism. Denies anterior neck pressure, dysphagia, or voice changes. Denies exposure to radiation or treatment with radiation to the head or neck.      Denies family history of thyroid cancer or thyroid disease.    Denies personal history of breast or colon cancer.      Osteoporosis treated for seven years.  Currently not on therapy   Denies falls  Fractures:   Fracture foot six years ago   Fractured toe one month ago     Previously on:   Fosamax 10+ years ago  Evista for about three years (after fosamax)     Balance is pretty good   Does not exercise     Dietary calcium only   Supplements:  Calcium: no  Vitamin D: no   MVI: no  Due to change in dilantin levels does not like to take supplements     Lab Results   Component Value Date    [id number] 11 (L) 05/06/2025    [id number] 14 (L) 09/06/2022     Dietary:  Milk, yogurt, cheese    Review of Systems  No recent illness   Objective:     Vitals:    05/06/25 1107   BP: 122/82   BP Location: Right arm   Patient Position: Sitting   Pulse: 90   SpO2: 98%   Weight: 62.9 kg (138 lb 12.5 oz)   Height: 5' 2" (1.575 m)         BP Readings from Last 3 Encounters:   05/06/25 122/82 " "  08/16/22 126/80   01/21/20 126/80     Wt Readings from Last 1 Encounters:   05/06/25 1107 62.9 kg (138 lb 12.5 oz)         Body mass index is 25.38 kg/m².    Lab Review:   No results found for: "HGBA1C"  No results found for: "CHOL", "HDL", "LDLCALC", "TRIG", "CHOLHDL"  Lab Results   Component Value Date     09/06/2022    K 4.1 09/06/2022     09/06/2022    CO2 29 09/06/2022    GLU 80 09/06/2022    BUN 13 09/06/2022    CREATININE 0.8 09/06/2022    CALCIUM 9.2 09/06/2022    PROT 6.9 09/06/2022    ALBUMIN 4.0 09/06/2022    BILITOT 0.3 09/06/2022    ALKPHOS 154 (H) 09/06/2022    AST 37 09/06/2022    ALT 13 09/06/2022    ANIONGAP 5 (L) 09/06/2022    ESTGFRAFRICA >60.0 01/21/2020    EGFRNONAA >60.0 01/21/2020    TSH 1.111 05/06/2025     US 2022  Thyroid gland has heterogeneous echotexture and normal vascularity.     There is a 1.9 x 1.1 x 1.5 cm hypoechoic nodule seen in the right superior lobe.  This nodule is stable and does not meet criteria for repeat FNA.     There is a 3.2 x 2.5 x 2.4 cm isoechoic nodule seen in the right middle lobe.  This nodule is stable and does not meet criteria for repeat FNA.     There is a 1.0 x 0.6 x 0.9 cm heterogeneous nodule seen in the left inferior lobe.  This nodule is stable and does not meet criteria for FNA.  1) FNA LEFT Thyroid (Clinician)  FINAL PATHOLOGIC DIAGNOSIS  [employer] Cytopathology Thyroid Category:Unsatisfactory. Macrophages only.  6/2013      6/2013      3/2012    Assessment and Plan     Multiple thyroid nodules  Clinically euthyroid   No labs done recently     Lab: TSH and ultrasound of the thyroid     Vitamin D insufficiency  Very low vitamin D in the past   Recommend over the counter vitamin D 1000 - 2000 IUs     Age-related osteoporosis without current pathological fracture  Risk factors: , early 50s, no MHT and vitamin D deficiency   Check vitamin D levels   Restart 77654 IUs daily   Dietary calcium     Needs updated DXA scan "   Could start alendronate again

## 2025-05-06 NOTE — ASSESSMENT & PLAN NOTE
Risk factors: , early 50s, no MHT and vitamin D deficiency   Check vitamin D levels   Restart 66952 IUs daily   Dietary calcium     Needs updated DXA scan   Could start alendronate again

## 2025-05-08 ENCOUNTER — RESULTS FOLLOW-UP (OUTPATIENT)
Dept: ENDOCRINOLOGY | Facility: CLINIC | Age: 65
End: 2025-05-08

## 2025-05-08 ENCOUNTER — TELEPHONE (OUTPATIENT)
Dept: ENDOCRINOLOGY | Facility: CLINIC | Age: 65
End: 2025-05-08
Payer: COMMERCIAL

## 2025-05-08 NOTE — TELEPHONE ENCOUNTER
Results for orders placed or performed in visit on 05/06/25   TSH    Collection Time: 05/06/25 11:57 AM   Result Value Ref Range    TSH 1.111 0.400 - 4.000 uIU/mL   Vitamin D    Collection Time: 05/06/25 11:57 AM   Result Value Ref Range    Vitamin D 11 (L) 30 - 96 ng/mL     TSH normal   Vitramin D deficient

## 2025-06-09 ENCOUNTER — HOSPITAL ENCOUNTER (OUTPATIENT)
Dept: RADIOLOGY | Facility: HOSPITAL | Age: 65
Discharge: HOME | End: 2025-06-09
Attending: INTERNAL MEDICINE
Payer: COMMERCIAL

## 2025-06-09 ENCOUNTER — HOSPITAL ENCOUNTER (OUTPATIENT)
Dept: RADIOLOGY | Facility: CLINIC | Age: 65
Discharge: HOME | End: 2025-06-09
Attending: INTERNAL MEDICINE
Payer: COMMERCIAL

## 2025-06-09 DIAGNOSIS — E04.2 MULTIPLE THYROID NODULES: ICD-10-CM

## 2025-06-09 DIAGNOSIS — M81.0 AGE-RELATED OSTEOPOROSIS WITHOUT CURRENT PATHOLOGICAL FRACTURE: ICD-10-CM

## 2025-06-09 PROCEDURE — 77080 DXA BONE DENSITY AXIAL: CPT | Mod: 26,,, | Performed by: INTERNAL MEDICINE

## 2025-06-09 PROCEDURE — 77091 TBS TECHL CALCULATION ONLY: CPT

## 2025-06-09 PROCEDURE — 76536 US EXAM OF HEAD AND NECK: CPT | Mod: 26,,, | Performed by: STUDENT IN AN ORGANIZED HEALTH CARE EDUCATION/TRAINING PROGRAM

## 2025-06-09 PROCEDURE — 77092 TBS I&R FX RSK QHP: CPT | Mod: S$GLB,,, | Performed by: INTERNAL MEDICINE

## 2025-06-09 PROCEDURE — 76536 US EXAM OF HEAD AND NECK: CPT | Mod: TC

## 2025-06-12 ENCOUNTER — TELEPHONE (OUTPATIENT)
Dept: ENDOCRINOLOGY | Facility: CLINIC | Age: 65
End: 2025-06-12
Payer: COMMERCIAL

## 2025-06-17 ENCOUNTER — RESULTS FOLLOW-UP (OUTPATIENT)
Dept: ENDOCRINOLOGY | Facility: CLINIC | Age: 65
End: 2025-06-17
Payer: COMMERCIAL

## 2025-06-17 ENCOUNTER — TELEPHONE (OUTPATIENT)
Dept: ENDOCRINOLOGY | Facility: CLINIC | Age: 65
End: 2025-06-17
Payer: COMMERCIAL

## 2025-06-17 DIAGNOSIS — E04.2 MULTIPLE THYROID NODULES: Primary | ICD-10-CM

## 2025-06-17 NOTE — TELEPHONE ENCOUNTER
Discussed results   Does not want FNA  Prefers US in 12 months.     Cytology:               US 2025  Nodule #1     Size: 2.2 x 1.2 x 2.0 cm, previously 1.9 x 1.1 x 1.5 cm, Right lobe upper pole, solid/almost completely solid (2), isoechoic (1), wider-than-tall (0), smooth (0) margins.     Change: No     TI-RADS category: TR3 (3 points)     Nodule #2     Size: 0.9 x 0.8 x 1.2 cm cm, Right lobe midportion, solid/almost completely solid (2), isoechoic (1), wider-than-tall (0), smooth (0) margins.     Change: N/A     TI-RADS category: TR3 (3 points)     Nodule #3     Size: 2.8 x 3.4 x 2.2 cm, previously 3.2 x 2.5 x 2.4 cm, Right lobe lower pole, solid/almost completely solid (2), isoechoic (1), taller-than-wide (3), smooth (0) margins.     Change: No     TI-RADS category: TR4 (6 points)     Nodule #4     Size: 1.1 x 0.6 x 0.7 cm, previously 1.0 x 0.6 x 0.9 cm, Left lobe lower pole, solid/almost completely solid (2), very hypoechoic (3), wider-than-tall (0), smooth (0) margins.     Change: No     TI-RADS category: TR4 (4 points)     Additional cystic thyroid nodules, do not meet TI-RADS criteria for FNA or further follow-up.     Impression:     Multinodular thyroid as above.  Nodules appear stable and morphologically similar dating back to at least 07/01/2015.     Dominant TI-RADS 4 nodule meets criteria for FNA if not already performed.

## 2025-06-17 NOTE — TELEPHONE ENCOUNTER
Discssed DXA scan   Recommended anabolic therapy   Patient to d/w neurology as very hesistant to start new meds   Recommend:   Vitamin D 2000 IUs dialy,and/or 50K ergo every week   Lab Results   Component Value Date    [id number] 11 (L) 05/06/2025    [id number] 14 (L) 09/06/2022     Forteo daily

## 2025-07-08 DIAGNOSIS — E04.2 MULTIPLE THYROID NODULES: Primary | ICD-10-CM

## 2025-07-08 NOTE — PROGRESS NOTES
Has decided to undergo FNA of right sided 3.4 cm nodule     Nodule #3     Size: 2.8 x 3.4 x 2.2 cm, previously 3.2 x 2.5 x 2.4 cm, Right lobe lower pole, solid/almost completely solid (2), isoechoic (1), taller-than-wide (3), smooth (0) margins.     Change: No     TI-RADS category: TR4 (6 points)

## 2025-07-16 ENCOUNTER — HOSPITAL ENCOUNTER (OUTPATIENT)
Dept: ENDOCRINOLOGY | Facility: CLINIC | Age: 65
Discharge: HOME | End: 2025-07-16
Attending: INTERNAL MEDICINE
Payer: COMMERCIAL

## 2025-07-16 DIAGNOSIS — E04.2 MULTIPLE THYROID NODULES: Primary | ICD-10-CM

## 2025-07-16 PROCEDURE — 88173 CYTOPATH EVAL FNA REPORT: CPT | Mod: TC

## 2025-07-16 PROCEDURE — 10005 FNA BX W/US GDN 1ST LES: CPT | Mod: S$GLB,,, | Performed by: INTERNAL MEDICINE

## 2025-07-18 LAB
ESTROGEN SERPL-MCNC: NORMAL PG/ML
INSULIN SERPL-ACNC: NORMAL U[IU]/ML
LAB AP CLINICAL INFORMATION: NORMAL
LAB AP GROSS DESCRIPTION: NORMAL
LAB AP NON-GYN INTERPRETATION SPECIMEN 1: NORMAL
LAB AP PERFORMING LOCATION(S): NORMAL

## 2025-07-21 DIAGNOSIS — E04.2 MULTIPLE THYROID NODULES: Primary | ICD-10-CM

## 2025-07-25 ENCOUNTER — TELEPHONE (OUTPATIENT)
Dept: ENDOCRINOLOGY | Facility: CLINIC | Age: 65
End: 2025-07-25
Payer: COMMERCIAL

## 2025-07-29 ENCOUNTER — TELEPHONE (OUTPATIENT)
Dept: ENDOCRINOLOGY | Facility: CLINIC | Age: 65
End: 2025-07-29
Payer: COMMERCIAL

## 2025-08-27 ENCOUNTER — TELEPHONE (OUTPATIENT)
Dept: ENDOCRINOLOGY | Facility: CLINIC | Age: 65
End: 2025-08-27
Payer: COMMERCIAL

## 2025-09-03 ENCOUNTER — HOSPITAL ENCOUNTER (OUTPATIENT)
Dept: ENDOCRINOLOGY | Facility: CLINIC | Age: 65
Discharge: HOME | End: 2025-09-03
Attending: INTERNAL MEDICINE
Payer: COMMERCIAL

## 2025-09-03 DIAGNOSIS — E04.2 MULTIPLE THYROID NODULES: ICD-10-CM

## 2025-09-03 PROCEDURE — 10005 FNA BX W/US GDN 1ST LES: CPT | Mod: S$GLB,,, | Performed by: INTERNAL MEDICINE
